# Patient Record
Sex: MALE | Race: BLACK OR AFRICAN AMERICAN | NOT HISPANIC OR LATINO | Employment: FULL TIME | URBAN - METROPOLITAN AREA
[De-identification: names, ages, dates, MRNs, and addresses within clinical notes are randomized per-mention and may not be internally consistent; named-entity substitution may affect disease eponyms.]

---

## 2023-04-12 LAB — URATE (MG/DL) IN SER/PLAS: 9.6 MG/DL (ref 4–7.5)

## 2023-05-18 DIAGNOSIS — M10.9 GOUT, UNSPECIFIED CAUSE, UNSPECIFIED CHRONICITY, UNSPECIFIED SITE: Primary | ICD-10-CM

## 2023-05-18 RX ORDER — FEBUXOSTAT 80 MG/1
TABLET, FILM COATED ORAL
Qty: 90 TABLET | Refills: 0 | Status: SHIPPED | OUTPATIENT
Start: 2023-05-18 | End: 2024-02-05 | Stop reason: SDUPTHER

## 2023-07-09 DIAGNOSIS — E55.9 VITAMIN D DEFICIENCY, UNSPECIFIED: ICD-10-CM

## 2023-07-11 RX ORDER — VIT C/E/ZN/COPPR/LUTEIN/ZEAXAN 250MG-90MG
CAPSULE ORAL
Qty: 90 CAPSULE | Refills: 2 | Status: SHIPPED | OUTPATIENT
Start: 2023-07-11 | End: 2024-02-05 | Stop reason: ALTCHOICE

## 2023-08-03 LAB — PROSTATE SPECIFIC AG (NG/ML) IN SER/PLAS: 2 NG/ML (ref 0–4)

## 2023-08-20 DIAGNOSIS — I10 ESSENTIAL (PRIMARY) HYPERTENSION: ICD-10-CM

## 2023-08-21 RX ORDER — TERAZOSIN 5 MG/1
5 CAPSULE ORAL 2 TIMES DAILY
Qty: 60 CAPSULE | Refills: 0 | Status: SHIPPED | OUTPATIENT
Start: 2023-08-21 | End: 2023-09-25

## 2023-09-08 DIAGNOSIS — I10 PRIMARY HYPERTENSION: Primary | ICD-10-CM

## 2023-09-08 RX ORDER — LISINOPRIL 10 MG/1
1 TABLET ORAL DAILY
COMMUNITY
Start: 2013-08-29 | End: 2023-09-08 | Stop reason: SDUPTHER

## 2023-09-09 DIAGNOSIS — I10 PRIMARY HYPERTENSION: Primary | ICD-10-CM

## 2023-09-09 RX ORDER — TRIAMTERENE AND HYDROCHLOROTHIAZIDE 75; 50 MG/1; MG/1
1 TABLET ORAL DAILY
Qty: 30 TABLET | Refills: 0 | Status: SHIPPED | OUTPATIENT
Start: 2023-09-09 | End: 2024-02-05 | Stop reason: SDUPTHER

## 2023-09-09 RX ORDER — TRIAMTERENE AND HYDROCHLOROTHIAZIDE 75; 50 MG/1; MG/1
1 TABLET ORAL DAILY
COMMUNITY
Start: 2012-11-16 | End: 2023-09-09 | Stop reason: SDUPTHER

## 2023-09-12 RX ORDER — LISINOPRIL 10 MG/1
10 TABLET ORAL DAILY
Qty: 30 TABLET | Refills: 0 | Status: SHIPPED | OUTPATIENT
Start: 2023-09-12 | End: 2024-02-05 | Stop reason: ALTCHOICE

## 2023-09-25 DIAGNOSIS — I10 ESSENTIAL (PRIMARY) HYPERTENSION: ICD-10-CM

## 2023-09-25 RX ORDER — TERAZOSIN 5 MG/1
5 CAPSULE ORAL 2 TIMES DAILY
Qty: 60 CAPSULE | Refills: 0 | Status: SHIPPED | OUTPATIENT
Start: 2023-09-25 | End: 2023-11-07

## 2023-10-31 DIAGNOSIS — E78.2 MIXED HYPERLIPIDEMIA: ICD-10-CM

## 2023-11-02 RX ORDER — ATORVASTATIN CALCIUM 20 MG/1
20 TABLET, FILM COATED ORAL NIGHTLY
Qty: 30 TABLET | Refills: 0 | Status: SHIPPED | OUTPATIENT
Start: 2023-11-02 | End: 2024-02-05 | Stop reason: SDUPTHER

## 2023-11-04 DIAGNOSIS — I10 ESSENTIAL (PRIMARY) HYPERTENSION: ICD-10-CM

## 2023-11-07 RX ORDER — TERAZOSIN 5 MG/1
5 CAPSULE ORAL 2 TIMES DAILY
Qty: 60 CAPSULE | Refills: 0 | Status: SHIPPED | OUTPATIENT
Start: 2023-11-07 | End: 2023-12-27 | Stop reason: SDUPTHER

## 2023-12-15 DIAGNOSIS — R12 HEARTBURN: ICD-10-CM

## 2023-12-18 RX ORDER — OMEPRAZOLE 40 MG/1
40 CAPSULE, DELAYED RELEASE ORAL DAILY
Qty: 90 CAPSULE | Refills: 1 | Status: SHIPPED | OUTPATIENT
Start: 2023-12-18

## 2023-12-23 DIAGNOSIS — I10 ESSENTIAL (PRIMARY) HYPERTENSION: ICD-10-CM

## 2023-12-26 RX ORDER — TERAZOSIN 5 MG/1
5 CAPSULE ORAL 2 TIMES DAILY
Qty: 60 CAPSULE | Refills: 0 | OUTPATIENT
Start: 2023-12-26 | End: 2024-01-25

## 2023-12-29 PROBLEM — M10.9 GOUT: Status: ACTIVE | Noted: 2023-12-29

## 2023-12-29 PROBLEM — D72.819 LEUKOPENIA: Status: ACTIVE | Noted: 2023-12-29

## 2023-12-29 PROBLEM — E78.2 COMBINED HYPERLIPIDEMIA: Status: ACTIVE | Noted: 2023-12-29

## 2023-12-29 PROBLEM — E05.90 SUBCLINICAL HYPERTHYROIDISM: Status: ACTIVE | Noted: 2023-12-29

## 2023-12-29 PROBLEM — L72.3 SEBACEOUS CYST: Status: ACTIVE | Noted: 2023-12-29

## 2023-12-29 PROBLEM — E79.0 HYPERURICEMIA: Status: ACTIVE | Noted: 2023-12-29

## 2023-12-29 PROBLEM — M26.9 MANDIBULAR ANOMALY: Status: ACTIVE | Noted: 2023-12-29

## 2023-12-29 PROBLEM — R97.20 ELEVATED PSA: Status: ACTIVE | Noted: 2023-12-29

## 2023-12-29 PROBLEM — N13.8 BENIGN LOCALIZED HYPERPLASIA OF PROSTATE WITH URINARY OBSTRUCTION: Status: ACTIVE | Noted: 2023-12-29

## 2023-12-29 PROBLEM — E27.8 ADRENAL NODULE (MULTI): Status: ACTIVE | Noted: 2023-12-29

## 2023-12-29 PROBLEM — M25.562 LEFT KNEE PAIN: Status: ACTIVE | Noted: 2023-12-29

## 2023-12-29 PROBLEM — E27.9 ADRENAL NODULE: Status: ACTIVE | Noted: 2023-12-29

## 2023-12-29 PROBLEM — M25.571 RIGHT ANKLE PAIN: Status: ACTIVE | Noted: 2023-12-29

## 2023-12-29 PROBLEM — R12 HEARTBURN: Status: ACTIVE | Noted: 2023-12-29

## 2023-12-29 PROBLEM — I10 BENIGN ESSENTIAL HTN: Status: ACTIVE | Noted: 2023-12-29

## 2023-12-29 PROBLEM — M10.9 ACUTE GOUT INVOLVING TOE OF RIGHT FOOT: Status: ACTIVE | Noted: 2023-12-29

## 2023-12-29 PROBLEM — R73.03 PREDIABETES: Status: ACTIVE | Noted: 2023-12-29

## 2023-12-29 PROBLEM — R33.9 INCOMPLETE EMPTYING OF BLADDER: Status: ACTIVE | Noted: 2023-12-29

## 2023-12-29 PROBLEM — R94.31 ABNORMAL EKG: Status: ACTIVE | Noted: 2023-12-29

## 2023-12-29 PROBLEM — E11.9 DIABETES MELLITUS (MULTI): Status: ACTIVE | Noted: 2023-12-29

## 2023-12-29 PROBLEM — E55.9 VITAMIN D DEFICIENCY: Status: ACTIVE | Noted: 2023-12-29

## 2023-12-29 PROBLEM — S86.819A STRAIN OF CALF MUSCLE: Status: ACTIVE | Noted: 2023-12-29

## 2023-12-29 PROBLEM — I49.3 ASYMPTOMATIC PVCS: Status: ACTIVE | Noted: 2023-12-29

## 2023-12-29 PROBLEM — N40.1 BENIGN LOCALIZED HYPERPLASIA OF PROSTATE WITH URINARY OBSTRUCTION: Status: ACTIVE | Noted: 2023-12-29

## 2023-12-29 RX ORDER — FEBUXOSTAT 80 MG/1
1 TABLET, FILM COATED ORAL DAILY
COMMUNITY
Start: 2018-09-17 | End: 2024-04-19 | Stop reason: SDUPTHER

## 2023-12-29 RX ORDER — ATORVASTATIN CALCIUM 20 MG/1
1 TABLET, FILM COATED ORAL NIGHTLY
COMMUNITY
Start: 2021-11-23 | End: 2024-02-19 | Stop reason: SDUPTHER

## 2023-12-29 RX ORDER — ACETAMINOPHEN 500 MG
1 TABLET ORAL DAILY
COMMUNITY
Start: 2021-11-23 | End: 2024-05-31 | Stop reason: SDUPTHER

## 2023-12-29 RX ORDER — TERAZOSIN 5 MG/1
1 CAPSULE ORAL 2 TIMES DAILY
COMMUNITY
Start: 2014-03-21 | End: 2024-02-05 | Stop reason: SDUPTHER

## 2023-12-29 RX ORDER — TERAZOSIN 5 MG/1
5 CAPSULE ORAL 2 TIMES DAILY
Qty: 60 CAPSULE | Refills: 0 | Status: SHIPPED | OUTPATIENT
Start: 2023-12-29 | End: 2024-01-26

## 2023-12-29 RX ORDER — VIT C/E/ZN/COPPR/LUTEIN/ZEAXAN 250MG-90MG
1 CAPSULE ORAL DAILY
COMMUNITY
Start: 2021-11-23 | End: 2024-02-05 | Stop reason: ALTCHOICE

## 2023-12-29 RX ORDER — FEBUXOSTAT 40 MG/1
1 TABLET, FILM COATED ORAL DAILY
COMMUNITY
End: 2024-05-31 | Stop reason: ALTCHOICE

## 2023-12-29 RX ORDER — OMEPRAZOLE 40 MG/1
1 CAPSULE, DELAYED RELEASE ORAL DAILY
COMMUNITY
Start: 2023-07-12 | End: 2024-02-05 | Stop reason: SDUPTHER

## 2023-12-29 RX ORDER — IBUPROFEN 600 MG/1
TABLET ORAL EVERY 6 HOURS PRN
COMMUNITY
Start: 2022-05-02

## 2023-12-29 RX ORDER — METFORMIN HYDROCHLORIDE 500 MG/1
500 TABLET ORAL DAILY
COMMUNITY
End: 2024-02-09 | Stop reason: SINTOL

## 2024-01-10 ENCOUNTER — APPOINTMENT (OUTPATIENT)
Dept: PRIMARY CARE | Facility: CLINIC | Age: 59
End: 2024-01-10
Payer: COMMERCIAL

## 2024-01-25 DIAGNOSIS — I10 ESSENTIAL (PRIMARY) HYPERTENSION: ICD-10-CM

## 2024-01-26 RX ORDER — TERAZOSIN 5 MG/1
5 CAPSULE ORAL 2 TIMES DAILY
Qty: 60 CAPSULE | Refills: 0 | Status: SHIPPED | OUTPATIENT
Start: 2024-01-26 | End: 2024-02-05 | Stop reason: SDUPTHER

## 2024-01-31 ENCOUNTER — APPOINTMENT (OUTPATIENT)
Dept: PRIMARY CARE | Facility: CLINIC | Age: 59
End: 2024-01-31
Payer: COMMERCIAL

## 2024-02-05 ENCOUNTER — OFFICE VISIT (OUTPATIENT)
Dept: PRIMARY CARE | Facility: CLINIC | Age: 59
End: 2024-02-05
Payer: COMMERCIAL

## 2024-02-05 VITALS
HEIGHT: 70 IN | DIASTOLIC BLOOD PRESSURE: 75 MMHG | SYSTOLIC BLOOD PRESSURE: 149 MMHG | RESPIRATION RATE: 16 BRPM | BODY MASS INDEX: 36.13 KG/M2 | HEART RATE: 83 BPM | TEMPERATURE: 98 F | OXYGEN SATURATION: 96 % | WEIGHT: 252.4 LBS

## 2024-02-05 DIAGNOSIS — I10 PRIMARY HYPERTENSION: ICD-10-CM

## 2024-02-05 DIAGNOSIS — I10 BENIGN ESSENTIAL HTN: ICD-10-CM

## 2024-02-05 DIAGNOSIS — N40.1 BENIGN LOCALIZED HYPERPLASIA OF PROSTATE WITH URINARY OBSTRUCTION: ICD-10-CM

## 2024-02-05 DIAGNOSIS — E11.9 TYPE 2 DIABETES MELLITUS WITHOUT COMPLICATION, WITHOUT LONG-TERM CURRENT USE OF INSULIN (MULTI): Primary | ICD-10-CM

## 2024-02-05 DIAGNOSIS — N13.8 BENIGN LOCALIZED HYPERPLASIA OF PROSTATE WITH URINARY OBSTRUCTION: ICD-10-CM

## 2024-02-05 DIAGNOSIS — M10.9 GOUT, UNSPECIFIED CAUSE, UNSPECIFIED CHRONICITY, UNSPECIFIED SITE: ICD-10-CM

## 2024-02-05 PROCEDURE — 83036 HEMOGLOBIN GLYCOSYLATED A1C: CPT | Performed by: INTERNAL MEDICINE

## 2024-02-05 PROCEDURE — 99213 OFFICE O/P EST LOW 20 MIN: CPT | Performed by: INTERNAL MEDICINE

## 2024-02-05 PROCEDURE — 36415 COLL VENOUS BLD VENIPUNCTURE: CPT | Performed by: INTERNAL MEDICINE

## 2024-02-05 PROCEDURE — 3078F DIAST BP <80 MM HG: CPT | Performed by: INTERNAL MEDICINE

## 2024-02-05 PROCEDURE — 4010F ACE/ARB THERAPY RXD/TAKEN: CPT | Performed by: INTERNAL MEDICINE

## 2024-02-05 PROCEDURE — 3077F SYST BP >= 140 MM HG: CPT | Performed by: INTERNAL MEDICINE

## 2024-02-05 PROCEDURE — 3044F HG A1C LEVEL LT 7.0%: CPT | Performed by: INTERNAL MEDICINE

## 2024-02-05 RX ORDER — TERAZOSIN 5 MG/1
5 CAPSULE ORAL 2 TIMES DAILY
Qty: 180 CAPSULE | Refills: 1 | Status: SHIPPED | OUTPATIENT
Start: 2024-02-05

## 2024-02-05 RX ORDER — TRIAMTERENE AND HYDROCHLOROTHIAZIDE 75; 50 MG/1; MG/1
1 TABLET ORAL DAILY
Qty: 90 TABLET | Refills: 1 | Status: SHIPPED | OUTPATIENT
Start: 2024-02-05 | End: 2025-03-05

## 2024-02-05 RX ORDER — LISINOPRIL 20 MG/1
20 TABLET ORAL DAILY
Qty: 90 TABLET | Refills: 1 | Status: SHIPPED | OUTPATIENT
Start: 2024-02-05 | End: 2024-08-03

## 2024-02-05 SDOH — ECONOMIC STABILITY: FOOD INSECURITY: WITHIN THE PAST 12 MONTHS, THE FOOD YOU BOUGHT JUST DIDN'T LAST AND YOU DIDN'T HAVE MONEY TO GET MORE.: NEVER TRUE

## 2024-02-05 SDOH — ECONOMIC STABILITY: FOOD INSECURITY: WITHIN THE PAST 12 MONTHS, YOU WORRIED THAT YOUR FOOD WOULD RUN OUT BEFORE YOU GOT MONEY TO BUY MORE.: NEVER TRUE

## 2024-02-05 ASSESSMENT — LIFESTYLE VARIABLES
HOW MANY STANDARD DRINKS CONTAINING ALCOHOL DO YOU HAVE ON A TYPICAL DAY: PATIENT DOES NOT DRINK
AUDIT-C TOTAL SCORE: 0
SKIP TO QUESTIONS 9-10: 1
HOW OFTEN DO YOU HAVE A DRINK CONTAINING ALCOHOL: NEVER
HOW OFTEN DO YOU HAVE SIX OR MORE DRINKS ON ONE OCCASION: NEVER

## 2024-02-05 ASSESSMENT — ENCOUNTER SYMPTOMS
NAUSEA: 0
FEVER: 0
LOSS OF SENSATION IN FEET: 0
VOMITING: 0
OCCASIONAL FEELINGS OF UNSTEADINESS: 0
DEPRESSION: 0
ABDOMINAL PAIN: 0
CHILLS: 0
SHORTNESS OF BREATH: 0

## 2024-02-05 ASSESSMENT — PAIN SCALES - GENERAL: PAINLEVEL: 0-NO PAIN

## 2024-02-05 NOTE — PROGRESS NOTES
"Subjective   Patient ID: Thad Begum is a 58 y.o. male who presents for Follow-up.    Presents for follow up. Reports waking up earlier now to urinate.         Review of Systems   Constitutional:  Negative for chills and fever.   Respiratory:  Negative for shortness of breath.    Cardiovascular:  Negative for chest pain.   Gastrointestinal:  Negative for abdominal pain, nausea and vomiting.       Objective   /75 (BP Location: Left arm, Patient Position: Sitting, BP Cuff Size: Large adult)   Pulse 83   Temp 36.7 °C (98 °F) (Temporal)   Resp 16   Ht 1.784 m (5' 10.25\")   Wt 114 kg (252 lb 6.4 oz)   SpO2 96%   BMI 35.96 kg/m²     Physical Exam  Gen appearance: well groomed in NAD  A & O: alert and oriented x 3  CV: RRR   Lungs: CTA bilaterally  Extr: no edema   Assessment/Plan   Diabetes: check A1c today  High blood pressure: increase Lisinopril to 20 mg  Gout: controlled  BPH: pt will decrease nightly consumption of liquids close to bed time. If no resolution, contact .  Adrenal adenoma: still need follow up MRI. Was declined by insurance last year       "

## 2024-02-06 LAB
EST. AVERAGE GLUCOSE BLD GHB EST-MCNC: 146 MG/DL
HBA1C MFR BLD: 6.7 %

## 2024-02-09 DIAGNOSIS — E11.9 TYPE 2 DIABETES MELLITUS WITHOUT COMPLICATION, WITHOUT LONG-TERM CURRENT USE OF INSULIN (MULTI): Primary | ICD-10-CM

## 2024-02-09 DIAGNOSIS — E11.9 TYPE 2 DIABETES MELLITUS WITHOUT COMPLICATIONS (MULTI): ICD-10-CM

## 2024-02-09 RX ORDER — METFORMIN HYDROCHLORIDE 500 MG/1
500 TABLET ORAL DAILY
Qty: 90 TABLET | Refills: 1 | OUTPATIENT
Start: 2024-02-09

## 2024-02-12 ENCOUNTER — TELEPHONE (OUTPATIENT)
Dept: PRIMARY CARE | Facility: CLINIC | Age: 59
End: 2024-02-12
Payer: COMMERCIAL

## 2024-02-19 ENCOUNTER — OFFICE VISIT (OUTPATIENT)
Dept: PRIMARY CARE | Facility: CLINIC | Age: 59
End: 2024-02-19
Payer: COMMERCIAL

## 2024-02-19 VITALS
DIASTOLIC BLOOD PRESSURE: 72 MMHG | BODY MASS INDEX: 36.73 KG/M2 | HEIGHT: 70 IN | RESPIRATION RATE: 16 BRPM | TEMPERATURE: 97.6 F | OXYGEN SATURATION: 96 % | WEIGHT: 256.6 LBS | SYSTOLIC BLOOD PRESSURE: 126 MMHG | HEART RATE: 94 BPM

## 2024-02-19 DIAGNOSIS — I10 BENIGN ESSENTIAL HTN: Primary | ICD-10-CM

## 2024-02-19 DIAGNOSIS — E78.2 COMBINED HYPERLIPIDEMIA: ICD-10-CM

## 2024-02-19 PROCEDURE — 3074F SYST BP LT 130 MM HG: CPT | Performed by: INTERNAL MEDICINE

## 2024-02-19 PROCEDURE — 3044F HG A1C LEVEL LT 7.0%: CPT | Performed by: INTERNAL MEDICINE

## 2024-02-19 PROCEDURE — 4010F ACE/ARB THERAPY RXD/TAKEN: CPT | Performed by: INTERNAL MEDICINE

## 2024-02-19 PROCEDURE — 3078F DIAST BP <80 MM HG: CPT | Performed by: INTERNAL MEDICINE

## 2024-02-19 PROCEDURE — 99213 OFFICE O/P EST LOW 20 MIN: CPT | Performed by: INTERNAL MEDICINE

## 2024-02-19 RX ORDER — ATORVASTATIN CALCIUM 20 MG/1
20 TABLET, FILM COATED ORAL NIGHTLY
Qty: 90 TABLET | Refills: 1 | Status: SHIPPED | OUTPATIENT
Start: 2024-02-19

## 2024-02-19 ASSESSMENT — ENCOUNTER SYMPTOMS
LOSS OF SENSATION IN FEET: 0
OCCASIONAL FEELINGS OF UNSTEADINESS: 0
DEPRESSION: 0

## 2024-02-19 ASSESSMENT — PAIN SCALES - GENERAL: PAINLEVEL: 0-NO PAIN

## 2024-02-19 NOTE — PROGRESS NOTES
"Subjective   Patient ID: Thad Begum is a 58 y.o. male who presents for blood pressure check.   .  HPI:  Presents for follow up of bp after increasing Lisinopril to 20 mg. No problems reported with the increased dose. Was able to afford the Januvia and has started taking it.     ROS: No chest pain/SOB/SOTELO/cough    Objective   /72 (BP Location: Left arm, Patient Position: Sitting, BP Cuff Size: Large adult)   Pulse 94   Temp 36.4 °C (97.6 °F) (Temporal)   Resp 16   Ht 1.778 m (5' 10\")   Wt 116 kg (256 lb 9.6 oz)   SpO2 96%   BMI 36.82 kg/m²     Physical Exam  Gen appearance: well groomed in NAD  A & O: alert and oriented x 3  CV: RRR   Lungs: CTA bilaterally  Extr: no edema    Assessment/Plan   High blood pressure: cont meds  High cholesterol: renewed script  RTO March for A1c check.        "

## 2024-03-12 DIAGNOSIS — I10 ESSENTIAL (PRIMARY) HYPERTENSION: ICD-10-CM

## 2024-03-12 RX ORDER — LISINOPRIL 10 MG/1
10 TABLET ORAL DAILY
Qty: 90 TABLET | Refills: 1 | Status: SHIPPED | OUTPATIENT
Start: 2024-03-12 | End: 2024-05-31 | Stop reason: ALTCHOICE

## 2024-03-21 RX ORDER — METFORMIN HYDROCHLORIDE 500 MG/1
500 TABLET ORAL DAILY
Qty: 90 TABLET | Refills: 1 | OUTPATIENT
Start: 2024-03-21

## 2024-04-19 DIAGNOSIS — E79.0 HYPERURICEMIA WITHOUT SIGNS OF INFLAMMATORY ARTHRITIS AND TOPHACEOUS DISEASE: ICD-10-CM

## 2024-04-19 DIAGNOSIS — E11.9 TYPE 2 DIABETES MELLITUS WITHOUT COMPLICATION, WITHOUT LONG-TERM CURRENT USE OF INSULIN (MULTI): ICD-10-CM

## 2024-04-19 RX ORDER — FEBUXOSTAT 80 MG/1
80 TABLET, FILM COATED ORAL DAILY
Qty: 90 TABLET | Refills: 1 | Status: SHIPPED | OUTPATIENT
Start: 2024-04-19

## 2024-05-22 ENCOUNTER — APPOINTMENT (OUTPATIENT)
Dept: PRIMARY CARE | Facility: CLINIC | Age: 59
End: 2024-05-22
Payer: COMMERCIAL

## 2024-05-31 ENCOUNTER — OFFICE VISIT (OUTPATIENT)
Dept: PRIMARY CARE | Facility: CLINIC | Age: 59
End: 2024-05-31
Payer: COMMERCIAL

## 2024-05-31 VITALS
SYSTOLIC BLOOD PRESSURE: 130 MMHG | HEART RATE: 90 BPM | BODY MASS INDEX: 37.05 KG/M2 | WEIGHT: 258.8 LBS | TEMPERATURE: 98.3 F | DIASTOLIC BLOOD PRESSURE: 74 MMHG | OXYGEN SATURATION: 96 % | HEIGHT: 70 IN

## 2024-05-31 DIAGNOSIS — E55.9 VITAMIN D DEFICIENCY: ICD-10-CM

## 2024-05-31 DIAGNOSIS — E78.2 COMBINED HYPERLIPIDEMIA: ICD-10-CM

## 2024-05-31 DIAGNOSIS — I10 BENIGN ESSENTIAL HTN: ICD-10-CM

## 2024-05-31 DIAGNOSIS — E11.9 TYPE 2 DIABETES MELLITUS WITHOUT COMPLICATION, WITHOUT LONG-TERM CURRENT USE OF INSULIN (MULTI): Primary | ICD-10-CM

## 2024-05-31 PROCEDURE — 36415 COLL VENOUS BLD VENIPUNCTURE: CPT | Performed by: INTERNAL MEDICINE

## 2024-05-31 PROCEDURE — 99213 OFFICE O/P EST LOW 20 MIN: CPT | Performed by: INTERNAL MEDICINE

## 2024-05-31 PROCEDURE — 4010F ACE/ARB THERAPY RXD/TAKEN: CPT | Performed by: INTERNAL MEDICINE

## 2024-05-31 PROCEDURE — 3075F SYST BP GE 130 - 139MM HG: CPT | Performed by: INTERNAL MEDICINE

## 2024-05-31 PROCEDURE — 3078F DIAST BP <80 MM HG: CPT | Performed by: INTERNAL MEDICINE

## 2024-05-31 PROCEDURE — 3044F HG A1C LEVEL LT 7.0%: CPT | Performed by: INTERNAL MEDICINE

## 2024-05-31 PROCEDURE — 83036 HEMOGLOBIN GLYCOSYLATED A1C: CPT | Performed by: INTERNAL MEDICINE

## 2024-05-31 RX ORDER — ACETAMINOPHEN 500 MG
2000 TABLET ORAL DAILY
Qty: 90 CAPSULE | Refills: 1 | Status: SHIPPED | OUTPATIENT
Start: 2024-05-31

## 2024-05-31 ASSESSMENT — ENCOUNTER SYMPTOMS
SHORTNESS OF BREATH: 0
FEVER: 0
CHILLS: 0
NAUSEA: 0
DEPRESSION: 0
LOSS OF SENSATION IN FEET: 0
VOMITING: 0
OCCASIONAL FEELINGS OF UNSTEADINESS: 0
ABDOMINAL PAIN: 0

## 2024-05-31 ASSESSMENT — PAIN SCALES - GENERAL: PAINLEVEL: 0-NO PAIN

## 2024-05-31 NOTE — PROGRESS NOTES
"Subjective   Patient ID: Thad Begum is a 58 y.o. male who presents for Follow-up.    Presents for follow up. Taking all meds as prescribed. Reports walking up basement steps with laundry and \"sometimes\" feels like his heart is beating fast. Works out on the treadmill faithfully and has no problems then. Does not report this feeling at rest. States has \"worn a patch\" in the past that didn't show anything. No miguelangel CP any other time. Is not short of breath when that does occur. Does not occur \"often.\"         Review of Systems   Constitutional:  Negative for chills and fever.   Respiratory:  Negative for shortness of breath.    Cardiovascular:  Negative for chest pain.   Gastrointestinal:  Negative for abdominal pain, nausea and vomiting.       Objective   /74 (BP Location: Left arm, Patient Position: Sitting, BP Cuff Size: Large adult)   Pulse 90   Temp 36.8 °C (98.3 °F) (Skin)   Ht 1.778 m (5' 10\")   Wt 117 kg (258 lb 12.8 oz)   SpO2 96%   BMI 37.13 kg/m²     Physical Exam  Gen appearance: well groomed in NAD  A & O: alert and oriented x 3  CV: RRR   Lungs: CTA bilaterally  Extr: no edema   Assessment/Plan   HTN: cont meds  2.    DM: check A1c today  3.    High chol: cont med  4.    We discussed that you had a normal exercise stress test and ECHO in 2021. If these symptoms persist, worsen, or increase in frequency you are to call me and I will refer you to Cardiology.You have agreed with this plan.  5.     RTO July for CPE.        "

## 2024-06-01 LAB
EST. AVERAGE GLUCOSE BLD GHB EST-MCNC: 131 MG/DL
HBA1C MFR BLD: 6.2 %

## 2024-06-16 DIAGNOSIS — E78.2 COMBINED HYPERLIPIDEMIA: ICD-10-CM

## 2024-06-17 RX ORDER — ATORVASTATIN CALCIUM 20 MG/1
20 TABLET, FILM COATED ORAL NIGHTLY
Qty: 90 TABLET | Refills: 1 | Status: SHIPPED | OUTPATIENT
Start: 2024-06-17

## 2024-06-18 DIAGNOSIS — R12 HEARTBURN: ICD-10-CM

## 2024-06-18 RX ORDER — OMEPRAZOLE 40 MG/1
40 CAPSULE, DELAYED RELEASE ORAL DAILY
Qty: 90 CAPSULE | Refills: 1 | Status: SHIPPED | OUTPATIENT
Start: 2024-06-18

## 2024-07-24 ENCOUNTER — OFFICE VISIT (OUTPATIENT)
Dept: PRIMARY CARE | Facility: CLINIC | Age: 59
End: 2024-07-24
Payer: COMMERCIAL

## 2024-07-24 VITALS
DIASTOLIC BLOOD PRESSURE: 72 MMHG | HEIGHT: 70 IN | HEART RATE: 93 BPM | BODY MASS INDEX: 37.25 KG/M2 | RESPIRATION RATE: 16 BRPM | OXYGEN SATURATION: 99 % | TEMPERATURE: 97.3 F | WEIGHT: 260.2 LBS | SYSTOLIC BLOOD PRESSURE: 110 MMHG

## 2024-07-24 DIAGNOSIS — Z00.00 ENCOUNTER FOR PREVENTIVE HEALTH EXAMINATION: ICD-10-CM

## 2024-07-24 DIAGNOSIS — E79.0 HYPERURICEMIA WITHOUT SIGNS OF INFLAMMATORY ARTHRITIS AND TOPHACEOUS DISEASE: ICD-10-CM

## 2024-07-24 DIAGNOSIS — E11.9 TYPE 2 DIABETES MELLITUS WITHOUT COMPLICATION, WITHOUT LONG-TERM CURRENT USE OF INSULIN (MULTI): Primary | ICD-10-CM

## 2024-07-24 PROBLEM — M25.562 LEFT KNEE PAIN: Status: RESOLVED | Noted: 2023-12-29 | Resolved: 2024-07-24

## 2024-07-24 PROCEDURE — 82306 VITAMIN D 25 HYDROXY: CPT | Performed by: INTERNAL MEDICINE

## 2024-07-24 PROCEDURE — 83036 HEMOGLOBIN GLYCOSYLATED A1C: CPT | Performed by: INTERNAL MEDICINE

## 2024-07-24 PROCEDURE — 36415 COLL VENOUS BLD VENIPUNCTURE: CPT | Performed by: INTERNAL MEDICINE

## 2024-07-24 PROCEDURE — 80061 LIPID PANEL: CPT | Performed by: INTERNAL MEDICINE

## 2024-07-24 PROCEDURE — 84443 ASSAY THYROID STIM HORMONE: CPT | Performed by: INTERNAL MEDICINE

## 2024-07-24 PROCEDURE — 3008F BODY MASS INDEX DOCD: CPT | Performed by: INTERNAL MEDICINE

## 2024-07-24 PROCEDURE — 4010F ACE/ARB THERAPY RXD/TAKEN: CPT | Performed by: INTERNAL MEDICINE

## 2024-07-24 PROCEDURE — 85027 COMPLETE CBC AUTOMATED: CPT | Performed by: INTERNAL MEDICINE

## 2024-07-24 PROCEDURE — 99396 PREV VISIT EST AGE 40-64: CPT | Performed by: INTERNAL MEDICINE

## 2024-07-24 PROCEDURE — 1036F TOBACCO NON-USER: CPT | Performed by: INTERNAL MEDICINE

## 2024-07-24 PROCEDURE — 3074F SYST BP LT 130 MM HG: CPT | Performed by: INTERNAL MEDICINE

## 2024-07-24 PROCEDURE — 3078F DIAST BP <80 MM HG: CPT | Performed by: INTERNAL MEDICINE

## 2024-07-24 PROCEDURE — 3044F HG A1C LEVEL LT 7.0%: CPT | Performed by: INTERNAL MEDICINE

## 2024-07-24 PROCEDURE — 84550 ASSAY OF BLOOD/URIC ACID: CPT | Performed by: INTERNAL MEDICINE

## 2024-07-24 PROCEDURE — 80053 COMPREHEN METABOLIC PANEL: CPT | Performed by: INTERNAL MEDICINE

## 2024-07-24 RX ORDER — FEBUXOSTAT 80 MG/1
80 TABLET, FILM COATED ORAL DAILY
Qty: 90 TABLET | Refills: 1 | Status: SHIPPED | OUTPATIENT
Start: 2024-07-24

## 2024-07-24 SDOH — ECONOMIC STABILITY: FOOD INSECURITY: WITHIN THE PAST 12 MONTHS, THE FOOD YOU BOUGHT JUST DIDN'T LAST AND YOU DIDN'T HAVE MONEY TO GET MORE.: NEVER TRUE

## 2024-07-24 SDOH — ECONOMIC STABILITY: FOOD INSECURITY: WITHIN THE PAST 12 MONTHS, YOU WORRIED THAT YOUR FOOD WOULD RUN OUT BEFORE YOU GOT MONEY TO BUY MORE.: NEVER TRUE

## 2024-07-24 ASSESSMENT — ENCOUNTER SYMPTOMS
NAUSEA: 0
ABDOMINAL PAIN: 0
SHORTNESS OF BREATH: 0
FEVER: 0
OCCASIONAL FEELINGS OF UNSTEADINESS: 0
VOMITING: 0
CHILLS: 0
DEPRESSION: 0
LOSS OF SENSATION IN FEET: 0

## 2024-07-24 ASSESSMENT — LIFESTYLE VARIABLES: HOW MANY STANDARD DRINKS CONTAINING ALCOHOL DO YOU HAVE ON A TYPICAL DAY: PATIENT DOES NOT DRINK

## 2024-07-24 ASSESSMENT — PATIENT HEALTH QUESTIONNAIRE - PHQ9
SUM OF ALL RESPONSES TO PHQ9 QUESTIONS 1 AND 2: 0
2. FEELING DOWN, DEPRESSED OR HOPELESS: NOT AT ALL
1. LITTLE INTEREST OR PLEASURE IN DOING THINGS: NOT AT ALL

## 2024-07-24 ASSESSMENT — PAIN SCALES - GENERAL: PAINLEVEL: 0-NO PAIN

## 2024-07-24 NOTE — PROGRESS NOTES
"Subjective   Patient ID: Thad Begum is a 58 y.o. male who presents for Annual Exam (PHYSICAL).    Presents for annual exam. Describes an intermittent throbbing along his right temple but it is not pain.          Review of Systems   Constitutional:  Negative for chills and fever.   Respiratory:  Negative for shortness of breath.    Cardiovascular:  Negative for chest pain.   Gastrointestinal:  Negative for abdominal pain, nausea and vomiting.       Objective   /72   Pulse 93   Temp 36.3 °C (97.3 °F)   Resp 16   Ht 1.778 m (5' 10\")   Wt 118 kg (260 lb 3.2 oz)   SpO2 99%   BMI 37.33 kg/m²     Physical Exam    Gen:well groomed in Nad  A & O: alert and oriented x 3  HEENT: PEERL, EOMI, TM's clear bilaterally, no carotid bruits, no palpable thyroid nodules  CV: RRR  Lungs: CTA bilaterally  Abd: soft, NT/ND  Extr: no edema  Neuro: CN 2-12 intact, motor 5/5, sensation intact  Rectal: deferred to     Assessment/Plan   Check annual labs  Keep appt with Urology for annual visit. We discussed bringing up how the Terazosin is dosed and could it be modified.   RTO 6 mos  4.   UTD colon     "

## 2024-07-25 LAB
25(OH)D3 SERPL-MCNC: 31 NG/ML (ref 30–100)
ALBUMIN SERPL BCP-MCNC: 4.2 G/DL (ref 3.4–5)
ALP SERPL-CCNC: 72 U/L (ref 33–120)
ALT SERPL W P-5'-P-CCNC: 28 U/L (ref 10–52)
ANION GAP SERPL CALC-SCNC: 12 MMOL/L (ref 10–20)
AST SERPL W P-5'-P-CCNC: 19 U/L (ref 9–39)
BILIRUB SERPL-MCNC: 0.7 MG/DL (ref 0–1.2)
BUN SERPL-MCNC: 18 MG/DL (ref 6–23)
CALCIUM SERPL-MCNC: 9.9 MG/DL (ref 8.6–10.6)
CHLORIDE SERPL-SCNC: 103 MMOL/L (ref 98–107)
CHOLEST SERPL-MCNC: 141 MG/DL (ref 0–199)
CHOLESTEROL/HDL RATIO: 2.6
CO2 SERPL-SCNC: 27 MMOL/L (ref 21–32)
CREAT SERPL-MCNC: 1.05 MG/DL (ref 0.5–1.3)
EGFRCR SERPLBLD CKD-EPI 2021: 83 ML/MIN/1.73M*2
ERYTHROCYTE [DISTWIDTH] IN BLOOD BY AUTOMATED COUNT: 12.5 % (ref 11.5–14.5)
EST. AVERAGE GLUCOSE BLD GHB EST-MCNC: 131 MG/DL
GLUCOSE SERPL-MCNC: 143 MG/DL (ref 74–99)
HBA1C MFR BLD: 6.2 %
HCT VFR BLD AUTO: 43.8 % (ref 41–52)
HDLC SERPL-MCNC: 53.3 MG/DL
HGB BLD-MCNC: 14.1 G/DL (ref 13.5–17.5)
LDLC SERPL CALC-MCNC: 77 MG/DL
MCH RBC QN AUTO: 28.1 PG (ref 26–34)
MCHC RBC AUTO-ENTMCNC: 32.2 G/DL (ref 32–36)
MCV RBC AUTO: 87 FL (ref 80–100)
NON HDL CHOLESTEROL: 88 MG/DL (ref 0–149)
NRBC BLD-RTO: 0 /100 WBCS (ref 0–0)
PLATELET # BLD AUTO: 299 X10*3/UL (ref 150–450)
POTASSIUM SERPL-SCNC: 3.9 MMOL/L (ref 3.5–5.3)
PROT SERPL-MCNC: 7.2 G/DL (ref 6.4–8.2)
RBC # BLD AUTO: 5.01 X10*6/UL (ref 4.5–5.9)
SODIUM SERPL-SCNC: 138 MMOL/L (ref 136–145)
TRIGL SERPL-MCNC: 52 MG/DL (ref 0–149)
TSH SERPL-ACNC: 0.65 MIU/L (ref 0.44–3.98)
URATE SERPL-MCNC: 5.3 MG/DL (ref 4–7.5)
VLDL: 10 MG/DL (ref 0–40)
WBC # BLD AUTO: 4 X10*3/UL (ref 4.4–11.3)

## 2024-07-30 DIAGNOSIS — N40.1 BENIGN LOCALIZED HYPERPLASIA OF PROSTATE WITH URINARY OBSTRUCTION: ICD-10-CM

## 2024-07-30 DIAGNOSIS — I10 PRIMARY HYPERTENSION: ICD-10-CM

## 2024-07-30 DIAGNOSIS — N13.8 BENIGN LOCALIZED HYPERPLASIA OF PROSTATE WITH URINARY OBSTRUCTION: ICD-10-CM

## 2024-07-30 RX ORDER — TERAZOSIN 5 MG/1
5 CAPSULE ORAL 2 TIMES DAILY
Qty: 180 CAPSULE | Refills: 1 | Status: SHIPPED | OUTPATIENT
Start: 2024-07-30

## 2024-07-30 RX ORDER — LISINOPRIL 20 MG/1
20 TABLET ORAL DAILY
Qty: 90 TABLET | Refills: 1 | Status: SHIPPED | OUTPATIENT
Start: 2024-07-30

## 2024-08-15 ENCOUNTER — APPOINTMENT (OUTPATIENT)
Dept: UROLOGY | Facility: CLINIC | Age: 59
End: 2024-08-15
Payer: COMMERCIAL

## 2024-09-05 ENCOUNTER — APPOINTMENT (OUTPATIENT)
Dept: UROLOGY | Facility: CLINIC | Age: 59
End: 2024-09-05
Payer: COMMERCIAL

## 2024-09-12 DIAGNOSIS — I10 PRIMARY HYPERTENSION: ICD-10-CM

## 2024-09-12 RX ORDER — TRIAMTERENE AND HYDROCHLOROTHIAZIDE 75; 50 MG/1; MG/1
1 TABLET ORAL DAILY
Qty: 90 TABLET | Refills: 1 | Status: SHIPPED | OUTPATIENT
Start: 2024-09-12

## 2024-10-03 ENCOUNTER — APPOINTMENT (OUTPATIENT)
Dept: UROLOGY | Facility: CLINIC | Age: 59
End: 2024-10-03
Payer: COMMERCIAL

## 2024-10-03 DIAGNOSIS — R97.20 ELEVATED PSA: Primary | ICD-10-CM

## 2024-10-08 ENCOUNTER — APPOINTMENT (OUTPATIENT)
Dept: PRIMARY CARE | Facility: CLINIC | Age: 59
End: 2024-10-08
Payer: COMMERCIAL

## 2024-10-08 ENCOUNTER — CLINICAL SUPPORT (OUTPATIENT)
Dept: PRIMARY CARE | Facility: CLINIC | Age: 59
End: 2024-10-08
Payer: COMMERCIAL

## 2024-10-08 DIAGNOSIS — R97.20 ELEVATED PSA: ICD-10-CM

## 2024-10-08 LAB — PSA SERPL-MCNC: 1.81 NG/ML

## 2024-10-08 PROCEDURE — 36415 COLL VENOUS BLD VENIPUNCTURE: CPT

## 2024-10-08 PROCEDURE — 84153 ASSAY OF PSA TOTAL: CPT

## 2024-10-10 ENCOUNTER — OFFICE VISIT (OUTPATIENT)
Dept: UROLOGY | Facility: CLINIC | Age: 59
End: 2024-10-10
Payer: COMMERCIAL

## 2024-10-10 VITALS
TEMPERATURE: 97.6 F | HEIGHT: 71 IN | HEART RATE: 58 BPM | DIASTOLIC BLOOD PRESSURE: 72 MMHG | OXYGEN SATURATION: 97 % | SYSTOLIC BLOOD PRESSURE: 138 MMHG | BODY MASS INDEX: 37.02 KG/M2 | WEIGHT: 264.4 LBS

## 2024-10-10 DIAGNOSIS — R97.20 ELEVATED PSA: Primary | ICD-10-CM

## 2024-10-10 LAB
POC APPEARANCE, URINE: CLEAR
POC BILIRUBIN, URINE: NEGATIVE
POC BLOOD, URINE: NEGATIVE
POC COLOR, URINE: YELLOW
POC GLUCOSE, URINE: NEGATIVE MG/DL
POC KETONES, URINE: NEGATIVE MG/DL
POC LEUKOCYTES, URINE: NEGATIVE
POC NITRITE,URINE: NEGATIVE
POC PH, URINE: 6 PH
POC PROTEIN, URINE: NEGATIVE MG/DL
POC SPECIFIC GRAVITY, URINE: 1.02
POC UROBILINOGEN, URINE: 0.2 EU/DL

## 2024-10-10 PROCEDURE — 81003 URINALYSIS AUTO W/O SCOPE: CPT | Performed by: PHYSICIAN ASSISTANT

## 2024-10-10 PROCEDURE — 51798 US URINE CAPACITY MEASURE: CPT | Performed by: PHYSICIAN ASSISTANT

## 2024-10-10 PROCEDURE — 3078F DIAST BP <80 MM HG: CPT | Performed by: PHYSICIAN ASSISTANT

## 2024-10-10 PROCEDURE — 3075F SYST BP GE 130 - 139MM HG: CPT | Performed by: PHYSICIAN ASSISTANT

## 2024-10-10 PROCEDURE — 3044F HG A1C LEVEL LT 7.0%: CPT | Performed by: PHYSICIAN ASSISTANT

## 2024-10-10 PROCEDURE — 99214 OFFICE O/P EST MOD 30 MIN: CPT | Performed by: PHYSICIAN ASSISTANT

## 2024-10-10 PROCEDURE — 4010F ACE/ARB THERAPY RXD/TAKEN: CPT | Performed by: PHYSICIAN ASSISTANT

## 2024-10-10 PROCEDURE — 3008F BODY MASS INDEX DOCD: CPT | Performed by: PHYSICIAN ASSISTANT

## 2024-10-10 PROCEDURE — 3048F LDL-C <100 MG/DL: CPT | Performed by: PHYSICIAN ASSISTANT

## 2024-10-10 ASSESSMENT — PAIN SCALES - GENERAL: PAINLEVEL: 0-NO PAIN

## 2024-10-10 NOTE — PROGRESS NOTES
Subjective   Patient ID: Thad Begum is a 59 y.o. male who presents for Follow-up.  HPI  59-year old male with an elevated PSA normalized, BPH, terazosin seen for follow-up.    Patient is doing well symptomatically.  He reports nocturia x 1-2 depending on his fluid intake.  He denies bothersome urinary symptoms during the day.  She denies hematuria or dysuria.    UA negative  PVR minimal  Lab Results   Component Value Date    PSA 1.81 10/08/2024    PSA 2.00 08/03/2023    PSA 12.10 (H) 07/12/2023    PSA 1.21 07/29/2022     Review of Systems   Constitutional: Negative.    HENT: Negative.     Eyes: Negative.    Respiratory: Negative.     Cardiovascular: Negative.    Gastrointestinal: Negative.    Endocrine: Negative.    Genitourinary: Negative.    Musculoskeletal: Negative.    Skin: Negative.    Allergic/Immunologic: Negative.    Neurological: Negative.    Hematological: Negative.    Psychiatric/Behavioral: Negative.           Objective   Physical Exam  Constitutional:       General: He is not in acute distress.     Appearance: Normal appearance.   HENT:      Head: Normocephalic and atraumatic.      Nose: Nose normal.      Mouth/Throat:      Mouth: Mucous membranes are moist.   Cardiovascular:      Rate and Rhythm: Normal rate.   Pulmonary:      Effort: Pulmonary effort is normal.   Abdominal:      General: Abdomen is flat.      Palpations: Abdomen is soft.   Genitourinary:     Penis: Normal.       Testes: Normal.      Prostate: Normal.   Musculoskeletal:      Cervical back: Normal range of motion.   Neurological:      Mental Status: He is alert.         Assessment/Plan     BPH  Will continue terazosin    Since his nocturia is related to fluid intake.  I discussed reducing fluids 2 hours prior to bed.    Elevated PSA  PSA level normalized.  Will continue monitoring PSAs once a year.  Order placed    Follow-up in 1 year or sooner as needed with a prior PSA       Timo Samaniego PA-C 10/11/24 7:06 AM [facility]

## 2024-10-11 ASSESSMENT — ENCOUNTER SYMPTOMS
HEMATOLOGIC/LYMPHATIC NEGATIVE: 1
RESPIRATORY NEGATIVE: 1
ENDOCRINE NEGATIVE: 1
GASTROINTESTINAL NEGATIVE: 1
EYES NEGATIVE: 1
CONSTITUTIONAL NEGATIVE: 1
PSYCHIATRIC NEGATIVE: 1
ALLERGIC/IMMUNOLOGIC NEGATIVE: 1
NEUROLOGICAL NEGATIVE: 1
MUSCULOSKELETAL NEGATIVE: 1
CARDIOVASCULAR NEGATIVE: 1

## 2024-11-08 DIAGNOSIS — E79.0 HYPERURICEMIA WITHOUT SIGNS OF INFLAMMATORY ARTHRITIS AND TOPHACEOUS DISEASE: ICD-10-CM

## 2024-11-08 RX ORDER — FEBUXOSTAT 80 MG/1
80 TABLET, FILM COATED ORAL DAILY
Qty: 90 TABLET | Refills: 1 | Status: SHIPPED | OUTPATIENT
Start: 2024-11-08

## 2024-11-20 ENCOUNTER — APPOINTMENT (OUTPATIENT)
Dept: URGENT CARE | Age: 59
End: 2024-11-20
Payer: COMMERCIAL

## 2024-11-23 DIAGNOSIS — R12 HEARTBURN: ICD-10-CM

## 2024-11-25 RX ORDER — OMEPRAZOLE 40 MG/1
40 CAPSULE, DELAYED RELEASE ORAL DAILY
Qty: 90 CAPSULE | Refills: 1 | Status: SHIPPED | OUTPATIENT
Start: 2024-11-25

## 2025-01-11 DIAGNOSIS — E55.9 VITAMIN D DEFICIENCY: ICD-10-CM

## 2025-01-13 RX ORDER — ACETAMINOPHEN 500 MG
2000 TABLET ORAL DAILY
Qty: 90 CAPSULE | Refills: 1 | Status: SHIPPED | OUTPATIENT
Start: 2025-01-13

## 2025-01-21 DIAGNOSIS — I10 PRIMARY HYPERTENSION: ICD-10-CM

## 2025-01-21 RX ORDER — LISINOPRIL 20 MG/1
20 TABLET ORAL DAILY
Qty: 90 TABLET | Refills: 1 | Status: SHIPPED | OUTPATIENT
Start: 2025-01-21

## 2025-01-24 ENCOUNTER — OFFICE VISIT (OUTPATIENT)
Dept: PRIMARY CARE | Facility: CLINIC | Age: 60
End: 2025-01-24
Payer: COMMERCIAL

## 2025-01-24 VITALS
RESPIRATION RATE: 16 BRPM | DIASTOLIC BLOOD PRESSURE: 71 MMHG | WEIGHT: 273 LBS | BODY MASS INDEX: 38.22 KG/M2 | OXYGEN SATURATION: 99 % | SYSTOLIC BLOOD PRESSURE: 129 MMHG | HEIGHT: 71 IN | TEMPERATURE: 97.4 F | HEART RATE: 75 BPM

## 2025-01-24 DIAGNOSIS — E78.2 COMBINED HYPERLIPIDEMIA: ICD-10-CM

## 2025-01-24 DIAGNOSIS — I10 BENIGN ESSENTIAL HTN: Primary | ICD-10-CM

## 2025-01-24 DIAGNOSIS — E11.9 TYPE 2 DIABETES MELLITUS WITHOUT COMPLICATION, WITHOUT LONG-TERM CURRENT USE OF INSULIN (MULTI): ICD-10-CM

## 2025-01-24 LAB
EST. AVERAGE GLUCOSE BLD GHB EST-MCNC: 151 MG/DL
HBA1C MFR BLD: 6.9 %

## 2025-01-24 PROCEDURE — 36415 COLL VENOUS BLD VENIPUNCTURE: CPT | Performed by: INTERNAL MEDICINE

## 2025-01-24 PROCEDURE — 4010F ACE/ARB THERAPY RXD/TAKEN: CPT | Performed by: INTERNAL MEDICINE

## 2025-01-24 PROCEDURE — 99214 OFFICE O/P EST MOD 30 MIN: CPT | Performed by: INTERNAL MEDICINE

## 2025-01-24 PROCEDURE — 1036F TOBACCO NON-USER: CPT | Performed by: INTERNAL MEDICINE

## 2025-01-24 PROCEDURE — 3008F BODY MASS INDEX DOCD: CPT | Performed by: INTERNAL MEDICINE

## 2025-01-24 PROCEDURE — 3074F SYST BP LT 130 MM HG: CPT | Performed by: INTERNAL MEDICINE

## 2025-01-24 PROCEDURE — 83036 HEMOGLOBIN GLYCOSYLATED A1C: CPT | Performed by: INTERNAL MEDICINE

## 2025-01-24 PROCEDURE — 3078F DIAST BP <80 MM HG: CPT | Performed by: INTERNAL MEDICINE

## 2025-01-24 RX ORDER — ATORVASTATIN CALCIUM 20 MG/1
20 TABLET, FILM COATED ORAL NIGHTLY
Qty: 90 TABLET | Refills: 1 | Status: SHIPPED | OUTPATIENT
Start: 2025-01-24

## 2025-01-24 SDOH — ECONOMIC STABILITY: FOOD INSECURITY: WITHIN THE PAST 12 MONTHS, YOU WORRIED THAT YOUR FOOD WOULD RUN OUT BEFORE YOU GOT MONEY TO BUY MORE.: NEVER TRUE

## 2025-01-24 SDOH — ECONOMIC STABILITY: FOOD INSECURITY: WITHIN THE PAST 12 MONTHS, THE FOOD YOU BOUGHT JUST DIDN'T LAST AND YOU DIDN'T HAVE MONEY TO GET MORE.: NEVER TRUE

## 2025-01-24 ASSESSMENT — PATIENT HEALTH QUESTIONNAIRE - PHQ9
1. LITTLE INTEREST OR PLEASURE IN DOING THINGS: NOT AT ALL
SUM OF ALL RESPONSES TO PHQ9 QUESTIONS 1 AND 2: 0
2. FEELING DOWN, DEPRESSED OR HOPELESS: NOT AT ALL

## 2025-01-24 ASSESSMENT — ENCOUNTER SYMPTOMS
NAUSEA: 0
FEVER: 0
DEPRESSION: 0
LOSS OF SENSATION IN FEET: 0
SHORTNESS OF BREATH: 0
OCCASIONAL FEELINGS OF UNSTEADINESS: 0
ABDOMINAL PAIN: 0
CHILLS: 0
VOMITING: 0

## 2025-01-24 ASSESSMENT — LIFESTYLE VARIABLES: HOW MANY STANDARD DRINKS CONTAINING ALCOHOL DO YOU HAVE ON A TYPICAL DAY: PATIENT DOES NOT DRINK

## 2025-01-24 ASSESSMENT — PAIN SCALES - GENERAL: PAINLEVEL_OUTOF10: 0-NO PAIN

## 2025-01-24 NOTE — PROGRESS NOTES
"Subjective   Patient ID: Thad Begum is a 59 y.o. male who presents for Follow-up.    Presents for follow up. Still grieving the loss of his childhood friend. Getting harder to afford the Jardiance. Not covered now by his insurance.         Review of Systems   Constitutional:  Negative for chills and fever.   Respiratory:  Negative for shortness of breath.    Cardiovascular:  Negative for chest pain.   Gastrointestinal:  Negative for abdominal pain, nausea and vomiting.       Objective   /71   Pulse 75   Temp 36.3 °C (97.4 °F) (Temporal)   Resp 16   Ht 1.803 m (5' 11\")   Wt 124 kg (273 lb)   SpO2 99%   BMI 38.08 kg/m²     Physical Exam  Gen appearance: well groomed in NAD  A & O: alert and oriented x 3  CV: RRR   Lungs: CTA bilaterally  Extr: no edema   Assessment/Plan   HTN: cont med  DM: will refer to Pharm d program for help with med affordability and management  Check A1c today  RTO CPE July       "

## 2025-01-30 ENCOUNTER — TELEMEDICINE (OUTPATIENT)
Dept: PHARMACY | Facility: HOSPITAL | Age: 60
End: 2025-01-30
Payer: COMMERCIAL

## 2025-01-30 DIAGNOSIS — E11.9 TYPE 2 DIABETES MELLITUS WITHOUT COMPLICATION, WITHOUT LONG-TERM CURRENT USE OF INSULIN (MULTI): ICD-10-CM

## 2025-01-30 NOTE — PROGRESS NOTES
Primary Care Clinical Pharmacist Initial Visit      Date of Initial Visit: 1/30/25  Disease state(s) to be managed by clinical pharmacist: DMT2  Referring Provider: Dr. Melany Gann (PCP)  Most recent appointment with PCP: 1/24/25  Next appointment with PCP: 7/28/25            Subjective:    Patient is a 58 YO M who presents virtually to visit with clinical pharmacist for initial visit for management of DMT2.  Patient gives consent to have visit conducted via telehealth. Patient was referred to clinical pharmacist for management of disease state(s) by PCP. At today's visit, patient reports taking Januvia 50mg PO daily and reports compliance with med and denies any ADRs to med but states that he only has 15 doses left of med and will need a new prescription to continue therapy with med which he will be unable to afford. Patient states that prior to taking Januvia he took metformin and that he experienced stomach queasiness with metformin (was not taking metformin with food). Patient states he is open to retrying metformin therapy and states will take med with food to reduce the chance of GI SE.    Patient does not check glucose level at home      Objective:      Most recent hemoglobin A1C level: 6.9 % (1/24/25) (goal: less than 7%)    Most recent eGFR: 83 (7/24/24)  Most recent vitamin B12 level:  none on file for the past 12 months  Most recent Albumin/SCr ratio: none on file for the past 12 months  Most recent AST/ALT: 19/28  (7/24/24)       Most recent diabetic retinopathy exam: 9/2024 (no diabetic retinopathy in either eye)   Most recent influenza vaccine: never-declines vaccine  Most recent pneumococcal vaccine: never      HPL:     Below are the results of patient's most recent lipid panel (from 7/24/24) :    LDL:   77  (goal: less than 70 )  TG :  52  (goal: less than 500)         HDL: 53.3    (goal: greater than 40)    TC: 141 (goal: less than 200)      Assessment:    Patient's DMT2 is currently controlled based on most recent hemoglobin A1C level of 6.9 % from 1/24/25. Patient currently only on Januvia 50mg PO daily but will run out of med soon and will not be able to continue to afford taking med. Patient previously took metformin and experienced GI SE with med, however patient was not taking med with food at the time. Patient is open to retrying metformin therapy and states will take med with food to reduce the chance of GI SE.    Plan:    -Continue Januvia 50mg PO daily   - Instructed patient to go to lab prior to next visit with clinical pharmacist to have CMP, vitamin B12 level, and albumin/SCr ratio rechecked      The patient verbalized understanding of everything discussed at today's visit and agrees with plan formulated by clinical pharmacist    Next visit with clinical pharmacist:  2/13/25 at 2:20 PM via telehealth      Continue all meds under the continuation of care with the referring provider and clinical pharmacy team.        TREVOR Bowen, PharmD, CPh, BCACP  Clinical Pharmacy Specialist, [facility]

## 2025-01-30 NOTE — ASSESSMENT & PLAN NOTE
Assessment:    Patient's DMT2 is currently controlled based on most recent hemoglobin A1C level of 6.9 % from 1/24/25. Patient currently only on Januvia 50mg PO daily but will run out of med soon and will not be able to continue to afford taking med. Patient previously took metformin and experienced GI SE with med, however patient was not taking med with food at the time. Patient is open to retrying metformin therapy and states will take med with food to reduce the chance of GI SE.    Plan:    -Continue Januvia 50mg PO daily   - Instructed patient to go to lab prior to next visit with clinical pharmacist to have CMP, vitamin B12 level, and albumin/SCr ratio rechecked

## 2025-02-02 DIAGNOSIS — N13.8 BENIGN LOCALIZED HYPERPLASIA OF PROSTATE WITH URINARY OBSTRUCTION: ICD-10-CM

## 2025-02-02 DIAGNOSIS — N40.1 BENIGN LOCALIZED HYPERPLASIA OF PROSTATE WITH URINARY OBSTRUCTION: ICD-10-CM

## 2025-02-03 RX ORDER — TERAZOSIN 5 MG/1
5 CAPSULE ORAL 2 TIMES DAILY
Qty: 180 CAPSULE | Refills: 1 | Status: SHIPPED | OUTPATIENT
Start: 2025-02-03 | End: 2025-02-04 | Stop reason: DRUGHIGH

## 2025-02-04 DIAGNOSIS — N13.8 BENIGN LOCALIZED HYPERPLASIA OF PROSTATE WITH URINARY OBSTRUCTION: Primary | ICD-10-CM

## 2025-02-04 DIAGNOSIS — N40.1 BENIGN LOCALIZED HYPERPLASIA OF PROSTATE WITH URINARY OBSTRUCTION: Primary | ICD-10-CM

## 2025-02-04 RX ORDER — TERAZOSIN 10 MG/1
10 CAPSULE ORAL NIGHTLY
Qty: 90 CAPSULE | Refills: 1 | Status: SHIPPED | OUTPATIENT
Start: 2025-02-04 | End: 2025-08-03

## 2025-02-07 ENCOUNTER — CLINICAL SUPPORT (OUTPATIENT)
Dept: PRIMARY CARE | Facility: CLINIC | Age: 60
End: 2025-02-07
Payer: COMMERCIAL

## 2025-02-07 DIAGNOSIS — E11.9 TYPE 2 DIABETES MELLITUS WITHOUT COMPLICATION, WITHOUT LONG-TERM CURRENT USE OF INSULIN (MULTI): ICD-10-CM

## 2025-02-08 LAB
ALBUMIN SERPL-MCNC: 4.4 G/DL (ref 3.6–5.1)
ALBUMIN/CREAT UR: NORMAL
ALP SERPL-CCNC: 73 U/L (ref 35–144)
ALT SERPL-CCNC: 43 U/L (ref 9–46)
ANION GAP SERPL CALCULATED.4IONS-SCNC: 15 MMOL/L (CALC) (ref 7–17)
AST SERPL-CCNC: 28 U/L (ref 10–35)
BILIRUB SERPL-MCNC: 0.7 MG/DL (ref 0.2–1.2)
BUN SERPL-MCNC: 25 MG/DL (ref 7–25)
CALCIUM SERPL-MCNC: 9.3 MG/DL (ref 8.6–10.3)
CHLORIDE SERPL-SCNC: 99 MMOL/L (ref 98–110)
CO2 SERPL-SCNC: 21 MMOL/L (ref 20–32)
CREAT SERPL-MCNC: 1.32 MG/DL (ref 0.7–1.3)
CREAT UR-MCNC: NORMAL MG/DL
EGFRCR SERPLBLD CKD-EPI 2021: 63 ML/MIN/1.73M2
GLUCOSE SERPL-MCNC: 138 MG/DL (ref 65–99)
MICROALBUMIN UR-MCNC: NORMAL
POTASSIUM SERPL-SCNC: 3.5 MMOL/L (ref 3.5–5.3)
PROT SERPL-MCNC: 7.1 G/DL (ref 6.1–8.1)
SODIUM SERPL-SCNC: 135 MMOL/L (ref 135–146)
VIT B12 SERPL-MCNC: 608 PG/ML (ref 200–1100)

## 2025-02-10 LAB
ALBUMIN SERPL-MCNC: 4.4 G/DL (ref 3.6–5.1)
ALBUMIN/CREAT UR: 12 MG/G CREAT
ALP SERPL-CCNC: 73 U/L (ref 35–144)
ALT SERPL-CCNC: 43 U/L (ref 9–46)
ANION GAP SERPL CALCULATED.4IONS-SCNC: 15 MMOL/L (CALC) (ref 7–17)
AST SERPL-CCNC: 28 U/L (ref 10–35)
BILIRUB SERPL-MCNC: 0.7 MG/DL (ref 0.2–1.2)
BUN SERPL-MCNC: 25 MG/DL (ref 7–25)
CALCIUM SERPL-MCNC: 9.3 MG/DL (ref 8.6–10.3)
CHLORIDE SERPL-SCNC: 99 MMOL/L (ref 98–110)
CO2 SERPL-SCNC: 21 MMOL/L (ref 20–32)
CREAT SERPL-MCNC: 1.32 MG/DL (ref 0.7–1.3)
CREAT UR-MCNC: 85 MG/DL (ref 20–320)
EGFRCR SERPLBLD CKD-EPI 2021: 63 ML/MIN/1.73M2
GLUCOSE SERPL-MCNC: 138 MG/DL (ref 65–99)
MICROALBUMIN UR-MCNC: 1 MG/DL
POTASSIUM SERPL-SCNC: 3.5 MMOL/L (ref 3.5–5.3)
PROT SERPL-MCNC: 7.1 G/DL (ref 6.1–8.1)
SODIUM SERPL-SCNC: 135 MMOL/L (ref 135–146)
VIT B12 SERPL-MCNC: 608 PG/ML (ref 200–1100)

## 2025-02-13 ENCOUNTER — APPOINTMENT (OUTPATIENT)
Dept: PHARMACY | Facility: HOSPITAL | Age: 60
End: 2025-02-13
Payer: COMMERCIAL

## 2025-02-13 DIAGNOSIS — E11.9 TYPE 2 DIABETES MELLITUS WITHOUT COMPLICATION, WITHOUT LONG-TERM CURRENT USE OF INSULIN (MULTI): ICD-10-CM

## 2025-02-13 RX ORDER — METFORMIN HYDROCHLORIDE 500 MG/1
1000 TABLET ORAL
Qty: 120 TABLET | Refills: 11 | Status: SHIPPED | OUTPATIENT
Start: 2025-02-13 | End: 2026-03-20

## 2025-02-13 NOTE — PROGRESS NOTES
Primary Care Clinical Pharmacist Follow-Up Visit    Date of Follow-Up Visit: 2/13/25  Date of Initial Visit: 1/30/25  Disease state(s) to be managed by clinical pharmacist: DMT2  Referring Provider: Dr. Melany Gann (PCP)  Most recent appointment with PCP: 1/24/25  Next appointment with PCP: 8/6/25            Subjective:    Patient is a 60 YO M who presents virtually to visit with clinical pharmacist for follow-up visit for management of DMT2.  Patient gives consent to have visit conducted via telehealth. Patient was referred to clinical pharmacist for management of disease state(s) by PCP. At last visit, patient was continued on Januvia 50mg PO daily. At today's visit, patient reports taking Januvia 50mg PO daily and reports compliance with med and denies any ADRs to med. Patient states that he will run out of med after taking tomorrow's dose and will not be able to continue to afford taking med. Patient states that prior to taking Januvia he took metformin and that he experienced stomach queasiness with metformin (was not taking metformin with food). Patient states he is open to retrying metformin therapy and states will take med with food to reduce the chance of GI SE.    Patient does not check glucose level at home      Objective:      Most recent hemoglobin A1C level: 6.9 % (1/24/25) (goal: less than 7%)    Most recent eGFR: 63 (2/7/25)  Most recent vitamin B12 level:  608 (2/7/25)  Most recent Albumin/SCr ratio: 12 (2/7/25)  Most recent AST/ALT: 28/43 (2/7/25)     Most recent diabetic retinopathy exam: 9/2024 (no diabetic retinopathy in either eye)   Most recent influenza vaccine: never-declines vaccine  Most recent pneumococcal vaccine: never      HPL:     Below are the results of patient's most recent lipid panel (from 7/24/24) :    LDL:   77  (goal: less than 70 )  TG :  52  (goal: less than  500)         HDL: 53.3   (goal: greater than 40)    TC: 141 (goal: less than 200)      Assessment:    Patient's DMT2 is currently controlled based on most recent hemoglobin A1C level of 6.9 % from 1/24/25. Patient currently only on Januvia 50mg PO daily but will run out of med after taking tomorrow's dose and will not be able to continue to afford taking med. Patient previously took metformin and experienced GI SE with med, however patient was not taking med with food at the time. Patient is open to retrying metformin therapy and states will take med with food to reduce the chance of GI SE.    Plan:    - Finish supply of Januvia 50mg PO daily and then start taking metformin 500mg tablet (prescription for med electronically sent to patient's preferred pharmacy) according to the following instructions:          - Take 1 tablet (500mg) PO QPM with dinner                    - Increase dose to 1 tablet (500mg) PO BID with meals if able to tolerate 1 tablet (500mg) PO QPM with dinner for 1-2 weeks                   - Increase dose to 2 tablets (1000mg) PO BID with meals if able to tolerate 1 tablet (500mg) PO BID with meals for 1-2 weeks           - Instructed patient to decrease dose back down to highest tolerable dose (and then remain on that dose) if patient not able to tolerate a dose increase in med                    - Instructed patient to stop med and subsequently notify clinical pharmacist if unable to tolerate  1 tablet (500mg) PO QPM with dinner     The patient verbalized understanding of everything discussed at today's visit and agrees with plan formulated by clinical pharmacist    Next visit with clinical pharmacist:  3/13/25 at 2:20 PM via telehealth      Continue all meds under the continuation of care with the referring provider and clinical pharmacy team.        TREVOR Bowen, PharmD, CPh, BCACP  Clinical Pharmacy Specialist, [facility]

## 2025-02-13 NOTE — ASSESSMENT & PLAN NOTE
Assessment:    Patient's DMT2 is currently controlled based on most recent hemoglobin A1C level of 6.9 % from 1/24/25. Patient currently only on Januvia 50mg PO daily but will run out of med after taking tomorrow's dose and will not be able to continue to afford taking med. Patient previously took metformin and experienced GI SE with med, however patient was not taking med with food at the time. Patient is open to retrying metformin therapy and states will take med with food to reduce the chance of GI SE.    Plan:    - Finish supply of Januvia 50mg PO daily and then start taking metformin 500mg tablet (prescription for med electronically sent to patient's preferred pharmacy) according to the following instructions:          - Take 1 tablet (500mg) PO QPM with dinner                    - Increase dose to 1 tablet (500mg) PO BID with meals if able to tolerate 1 tablet (500mg) PO QPM with dinner for 1-2 weeks                   - Increase dose to 2 tablets (1000mg) PO BID with meals if able to tolerate 1 tablet (500mg) PO BID with meals for 1-2 weeks           - Instructed patient to decrease dose back down to highest tolerable dose (and then remain on that dose) if patient not able to tolerate a dose increase in med                    - Instructed patient to stop med and subsequently notify clinical pharmacist if unable to tolerate  1 tablet (500mg) PO QPM with dinner

## 2025-02-13 NOTE — Clinical Note
Of note, patient's eGFR has declined from 83 to 63 in about half a year. Please address with patient as necessary.

## 2025-02-14 ENCOUNTER — APPOINTMENT (OUTPATIENT)
Dept: PRIMARY CARE | Facility: CLINIC | Age: 60
End: 2025-02-14
Payer: COMMERCIAL

## 2025-02-21 DIAGNOSIS — I10 PRIMARY HYPERTENSION: ICD-10-CM

## 2025-02-21 RX ORDER — TRIAMTERENE AND HYDROCHLOROTHIAZIDE 75; 50 MG/1; MG/1
1 TABLET ORAL DAILY
Qty: 90 TABLET | Refills: 1 | Status: SHIPPED | OUTPATIENT
Start: 2025-02-21

## 2025-03-13 ENCOUNTER — APPOINTMENT (OUTPATIENT)
Dept: PHARMACY | Facility: HOSPITAL | Age: 60
End: 2025-03-13
Payer: COMMERCIAL

## 2025-03-13 DIAGNOSIS — E11.9 TYPE 2 DIABETES MELLITUS WITHOUT COMPLICATION, WITHOUT LONG-TERM CURRENT USE OF INSULIN (MULTI): ICD-10-CM

## 2025-03-13 NOTE — PROGRESS NOTES
Primary Care Clinical Pharmacist Follow-Up Visit    Date of Follow-Up Visit: 3/13/25  Date of Initial Visit: 1/30/25  Disease state(s) to be managed by clinical pharmacist: DMT2  Referring Provider: Dr. Melany Gann (PCP)  Most recent appointment with PCP: 1/24/25  Next appointment with PCP: 8/6/25            Subjective:    Patient is a 58 YO M who presents virtually to visit with clinical pharmacist for follow-up visit for management of DMT2.  Patient gives consent to have visit conducted via telehealth. Patient was referred to clinical pharmacist for management of disease state(s) by PCP. At last visit, patient was instructed to finish supply of Januvia 50mg PO daily and then start taking metformin 500mg PO daily (with instructions to double dose every 1-2 weeks as tolerable to ultimately reach maximum effective dose of 1000mg PO BID). At today's visit, patient reports taking metformin 500mg PO daily with evening meal and states that this is the maximum tolerable dose of med for him.      Patient does not check glucose level at home      Objective:      Most recent hemoglobin A1C level: 6.9 % (1/24/25) (goal: less than 7%)    Most recent eGFR: 63 (2/7/25)  Most recent vitamin B12 level:  608 (2/7/25)  Most recent Albumin/SCr ratio: 12 (2/7/25)  Most recent AST/ALT: 28/43 (2/7/25)     Most recent diabetic retinopathy exam: 9/2024 (no diabetic retinopathy in either eye)   Most recent influenza vaccine: never-declines vaccine  Most recent pneumococcal vaccine: never      HPL:     Below are the results of patient's most recent lipid panel (from 7/24/24) :    LDL:   77  (goal: less than 70 )  TG :  52  (goal: less than 500)         HDL: 53.3   (goal: greater than 40)    TC: 141 (goal: less than 200)      Assessment:    Patient's DMT2 is currently controlled based on most recent hemoglobin A1C level of  6.9 % from 1/24/25. Patient currently only on metformin 500mg PO daily with evening meal (maximum tolerable dose) and reports compliance with med. Patient does not check glucose level at home    Plan:  - Continue metformin 500mg PO daily with evening meal          The patient verbalized understanding of everything discussed at today's visit and agrees with plan formulated by clinical pharmacist    Next visit with clinical pharmacist:  5/8/25 at 2:20 PM via telehealth      Continue all meds under the continuation of care with the referring provider and clinical pharmacy team.        TREVOR Bowen, PharmD, CPh, BCACP  Clinical Pharmacy Specialist, [facility]

## 2025-03-14 NOTE — ASSESSMENT & PLAN NOTE
Assessment:    Patient's DMT2 is currently controlled based on most recent hemoglobin A1C level of 6.9 % from 1/24/25. Patient currently only on metformin 500mg PO daily with evening meal (maximum tolerable dose) and reports compliance with med. Patient does not check glucose level at home    Plan:  - Continue metformin 500mg PO daily with evening meal

## 2025-05-08 ENCOUNTER — APPOINTMENT (OUTPATIENT)
Dept: PHARMACY | Facility: HOSPITAL | Age: 60
End: 2025-05-08
Payer: COMMERCIAL

## 2025-05-15 ENCOUNTER — APPOINTMENT (OUTPATIENT)
Dept: PHARMACY | Facility: HOSPITAL | Age: 60
End: 2025-05-15
Payer: COMMERCIAL

## 2025-05-15 DIAGNOSIS — E11.9 TYPE 2 DIABETES MELLITUS WITHOUT COMPLICATION, WITHOUT LONG-TERM CURRENT USE OF INSULIN: ICD-10-CM

## 2025-05-15 NOTE — PROGRESS NOTES
Primary Care Clinical Pharmacist Follow-Up Visit    Date of Follow-Up Visit: 5/15/25  Date of Initial Visit: 1/30/25  Disease state(s) to be managed by clinical pharmacist: DMT2  Referring Provider: Dr. Melany Gann (PCP)  Most recent appointment with PCP: 1/24/25  Next appointment with PCP: 8/6/25            Subjective:    Patient is a 60 YO M who presents virtually to visit with clinical pharmacist for follow-up visit for management of DMT2.  Patient gives consent to have visit conducted via telehealth. Patient was referred to clinical pharmacist for management of disease state(s) by PCP. At last visit, patient was instructed to  continue metformin 500mg PO daily with evening meal (maximum tolerable dose of med). At today's visit, patient reports taking metformin 500mg PO daily with evening meal and reports compliance with med and denies any ADRs to med at this dose.      Patient does not check glucose level at home      Objective:      Most recent hemoglobin A1C level: 6.9 % (1/24/25) (goal: less than 7%)    Most recent eGFR: 59 (3/15/25)  Most recent vitamin B12 level:  608 (2/7/25)  Most recent Albumin/SCr ratio: 12 (2/7/25)  Most recent AST/ALT: 22/32 (3/15/25)  Most recent diabetic retinopathy exam: 9/2024 (no diabetic retinopathy in either eye)   Most recent influenza vaccine: never-declines vaccine  Most recent pneumococcal vaccine: never      HPL:     Below are the results of patient's most recent lipid panel (from 7/24/24) :    LDL:   77  (goal: less than 70 )  TG :  52  (goal: less than 500)         HDL: 53.3   (goal: greater than 40)    TC: 141 (goal: less than 200)      Assessment:    Patient's DMT2 is currently controlled based on most recent hemoglobin A1C level of 6.9 % from 1/24/25. Patient currently only on metformin 500mg PO daily with evening meal (maximum tolerable dose) and  reports compliance with med. Patient does not check glucose level at home    Plan:  - Continue metformin 500mg PO daily with evening meal          The patient verbalized understanding of everything discussed at today's visit and agrees with plan formulated by clinical pharmacist    Next visit with clinical pharmacist:  8/7/25 at 2:20 PM via telehealth      Continue all meds under the continuation of care with the referring provider and clinical pharmacy team.        TREVOR Bowen, PharmD, BCACP  Clinical Pharmacy Specialist, [facility]

## 2025-05-17 DIAGNOSIS — R12 HEARTBURN: ICD-10-CM

## 2025-05-19 RX ORDER — OMEPRAZOLE 40 MG/1
40 CAPSULE, DELAYED RELEASE ORAL DAILY
Qty: 90 CAPSULE | Refills: 1 | Status: SHIPPED | OUTPATIENT
Start: 2025-05-19

## 2025-06-27 DIAGNOSIS — I10 PRIMARY HYPERTENSION: ICD-10-CM

## 2025-06-27 RX ORDER — LISINOPRIL 20 MG/1
20 TABLET ORAL DAILY
Qty: 90 TABLET | Refills: 1 | Status: SHIPPED | OUTPATIENT
Start: 2025-06-27

## 2025-07-17 DIAGNOSIS — E78.2 COMBINED HYPERLIPIDEMIA: ICD-10-CM

## 2025-07-17 RX ORDER — ATORVASTATIN CALCIUM 20 MG/1
20 TABLET, FILM COATED ORAL NIGHTLY
Qty: 90 TABLET | Refills: 1 | Status: SHIPPED | OUTPATIENT
Start: 2025-07-17

## 2025-07-25 DIAGNOSIS — N40.1 BENIGN LOCALIZED HYPERPLASIA OF PROSTATE WITH URINARY OBSTRUCTION: ICD-10-CM

## 2025-07-25 DIAGNOSIS — N13.8 BENIGN LOCALIZED HYPERPLASIA OF PROSTATE WITH URINARY OBSTRUCTION: ICD-10-CM

## 2025-07-25 RX ORDER — TERAZOSIN 10 MG/1
10 CAPSULE ORAL NIGHTLY
Qty: 90 CAPSULE | Refills: 1 | Status: SHIPPED | OUTPATIENT
Start: 2025-07-25 | End: 2026-01-21

## 2025-07-28 ENCOUNTER — APPOINTMENT (OUTPATIENT)
Dept: PRIMARY CARE | Facility: CLINIC | Age: 60
End: 2025-07-28
Payer: COMMERCIAL

## 2025-07-30 ENCOUNTER — APPOINTMENT (OUTPATIENT)
Dept: PRIMARY CARE | Facility: CLINIC | Age: 60
End: 2025-07-30
Payer: COMMERCIAL

## 2025-08-06 ENCOUNTER — APPOINTMENT (OUTPATIENT)
Dept: PRIMARY CARE | Facility: CLINIC | Age: 60
End: 2025-08-06
Payer: COMMERCIAL

## 2025-08-07 ENCOUNTER — OFFICE VISIT (OUTPATIENT)
Dept: PRIMARY CARE | Facility: CLINIC | Age: 60
End: 2025-08-07
Payer: COMMERCIAL

## 2025-08-07 ENCOUNTER — APPOINTMENT (OUTPATIENT)
Dept: PHARMACY | Facility: HOSPITAL | Age: 60
End: 2025-08-07
Payer: COMMERCIAL

## 2025-08-07 VITALS
RESPIRATION RATE: 16 BRPM | DIASTOLIC BLOOD PRESSURE: 74 MMHG | SYSTOLIC BLOOD PRESSURE: 128 MMHG | OXYGEN SATURATION: 96 % | HEIGHT: 71 IN | WEIGHT: 252.2 LBS | HEART RATE: 77 BPM | BODY MASS INDEX: 35.31 KG/M2 | TEMPERATURE: 97.8 F

## 2025-08-07 DIAGNOSIS — Z00.00 ENCOUNTER FOR PREVENTIVE HEALTH EXAMINATION: ICD-10-CM

## 2025-08-07 DIAGNOSIS — R00.2 PALPITATIONS: Primary | ICD-10-CM

## 2025-08-07 PROCEDURE — 4010F ACE/ARB THERAPY RXD/TAKEN: CPT | Performed by: INTERNAL MEDICINE

## 2025-08-07 PROCEDURE — 3074F SYST BP LT 130 MM HG: CPT | Performed by: INTERNAL MEDICINE

## 2025-08-07 PROCEDURE — 1036F TOBACCO NON-USER: CPT | Performed by: INTERNAL MEDICINE

## 2025-08-07 PROCEDURE — 3078F DIAST BP <80 MM HG: CPT | Performed by: INTERNAL MEDICINE

## 2025-08-07 PROCEDURE — 99213 OFFICE O/P EST LOW 20 MIN: CPT | Performed by: INTERNAL MEDICINE

## 2025-08-07 PROCEDURE — 99396 PREV VISIT EST AGE 40-64: CPT | Performed by: INTERNAL MEDICINE

## 2025-08-07 PROCEDURE — 3008F BODY MASS INDEX DOCD: CPT | Performed by: INTERNAL MEDICINE

## 2025-08-07 PROCEDURE — 36415 COLL VENOUS BLD VENIPUNCTURE: CPT | Performed by: INTERNAL MEDICINE

## 2025-08-07 SDOH — ECONOMIC STABILITY: FOOD INSECURITY: WITHIN THE PAST 12 MONTHS, THE FOOD YOU BOUGHT JUST DIDN'T LAST AND YOU DIDN'T HAVE MONEY TO GET MORE.: NEVER TRUE

## 2025-08-07 SDOH — ECONOMIC STABILITY: FOOD INSECURITY: WITHIN THE PAST 12 MONTHS, YOU WORRIED THAT YOUR FOOD WOULD RUN OUT BEFORE YOU GOT MONEY TO BUY MORE.: NEVER TRUE

## 2025-08-07 ASSESSMENT — ENCOUNTER SYMPTOMS
SHORTNESS OF BREATH: 0
VOMITING: 0
OCCASIONAL FEELINGS OF UNSTEADINESS: 0
DEPRESSION: 0
NAUSEA: 0
LOSS OF SENSATION IN FEET: 0
CHILLS: 0
FEVER: 0
ABDOMINAL PAIN: 0

## 2025-08-07 ASSESSMENT — PATIENT HEALTH QUESTIONNAIRE - PHQ9
SUM OF ALL RESPONSES TO PHQ9 QUESTIONS 1 AND 2: 0
1. LITTLE INTEREST OR PLEASURE IN DOING THINGS: NOT AT ALL
2. FEELING DOWN, DEPRESSED OR HOPELESS: NOT AT ALL

## 2025-08-07 ASSESSMENT — LIFESTYLE VARIABLES: HOW MANY STANDARD DRINKS CONTAINING ALCOHOL DO YOU HAVE ON A TYPICAL DAY: PATIENT DOES NOT DRINK

## 2025-08-07 ASSESSMENT — PAIN SCALES - GENERAL: PAINLEVEL_OUTOF10: 0-NO PAIN

## 2025-08-07 NOTE — PROGRESS NOTES
"Subjective   Patient ID: Thad Begum is a 59 y.o. male who presents for Annual Exam (CPE).    Presents for annual exam. Seen in [facility] for heart palps. Wore a Zio patch which did record some episodes of tachycardia.          Review of Systems   Constitutional:  Negative for chills and fever.   Respiratory:  Negative for shortness of breath.    Cardiovascular:  Negative for chest pain.   Gastrointestinal:  Negative for abdominal pain, nausea and vomiting.       Objective   /74   Pulse 77   Temp 36.6 °C (97.8 °F) (Temporal)   Resp 16   Ht 1.803 m (5' 11\")   Wt 114 kg (252 lb 3.2 oz)   SpO2 96%   BMI 35.17 kg/m²     Physical Exam  Gen:well groomed in NAD  A & O: alert and oriented x 3  HEENT: PEERL, EOMI, TM's clear bilaterally, no carotid bruits, no palpable thyroid nodules  CV: RRR  Lungs: CTA bilaterally  Abd: soft, NT/ND  Extr: no edema  Neuro: CN 2-12 intact, motor 5/5, sensation intact  Rectal: deferred     Assessment/Plan  Check labs  Palpitations/tachycardia: refer to Cards  Follow up with   RTO 6 mos       "

## 2025-08-08 LAB
25(OH)D3+25(OH)D2 SERPL-MCNC: 40 NG/ML (ref 30–100)
ALBUMIN SERPL-MCNC: 4.3 G/DL (ref 3.6–5.1)
ALP SERPL-CCNC: 68 U/L (ref 35–144)
ALT SERPL-CCNC: 36 U/L (ref 9–46)
ANION GAP SERPL CALCULATED.4IONS-SCNC: 11 MMOL/L (CALC) (ref 7–17)
AST SERPL-CCNC: 26 U/L (ref 10–35)
BILIRUB SERPL-MCNC: 0.5 MG/DL (ref 0.2–1.2)
BUN SERPL-MCNC: 24 MG/DL (ref 7–25)
CALCIUM SERPL-MCNC: 9.8 MG/DL (ref 8.6–10.3)
CHLORIDE SERPL-SCNC: 106 MMOL/L (ref 98–110)
CHOLEST SERPL-MCNC: 148 MG/DL
CHOLEST/HDLC SERPL: 2.4 (CALC)
CO2 SERPL-SCNC: 25 MMOL/L (ref 20–32)
CREAT SERPL-MCNC: 1.29 MG/DL (ref 0.7–1.3)
EGFRCR SERPLBLD CKD-EPI 2021: 64 ML/MIN/1.73M2
EST. AVERAGE GLUCOSE BLD GHB EST-MCNC: 137 MG/DL
EST. AVERAGE GLUCOSE BLD GHB EST-SCNC: 7.6 MMOL/L
GLUCOSE SERPL-MCNC: 136 MG/DL (ref 65–99)
HBA1C MFR BLD: 6.4 %
HDLC SERPL-MCNC: 61 MG/DL
LDLC SERPL CALC-MCNC: 73 MG/DL (CALC)
NONHDLC SERPL-MCNC: 87 MG/DL (CALC)
POTASSIUM SERPL-SCNC: 3.6 MMOL/L (ref 3.5–5.3)
PROT SERPL-MCNC: 7 G/DL (ref 6.1–8.1)
SODIUM SERPL-SCNC: 142 MMOL/L (ref 135–146)
TRIGL SERPL-MCNC: 68 MG/DL
TSH SERPL-ACNC: 0.61 MIU/L (ref 0.4–4.5)

## 2025-08-12 ENCOUNTER — APPOINTMENT (OUTPATIENT)
Dept: PHARMACY | Facility: HOSPITAL | Age: 60
End: 2025-08-12
Payer: COMMERCIAL

## 2025-08-12 DIAGNOSIS — E11.9 TYPE 2 DIABETES MELLITUS WITHOUT COMPLICATION, WITHOUT LONG-TERM CURRENT USE OF INSULIN: Primary | ICD-10-CM

## 2025-08-17 DIAGNOSIS — I10 PRIMARY HYPERTENSION: ICD-10-CM

## 2025-08-18 RX ORDER — TRIAMTERENE AND HYDROCHLOROTHIAZIDE 75; 50 MG/1; MG/1
1 TABLET ORAL
Qty: 90 TABLET | Refills: 1 | Status: SHIPPED | OUTPATIENT
Start: 2025-08-18

## 2025-09-05 ENCOUNTER — OFFICE VISIT (OUTPATIENT)
Dept: CARDIOLOGY | Facility: CLINIC | Age: 60
End: 2025-09-05
Payer: COMMERCIAL

## 2025-09-05 VITALS
HEART RATE: 100 BPM | WEIGHT: 256 LBS | OXYGEN SATURATION: 95 % | SYSTOLIC BLOOD PRESSURE: 158 MMHG | BODY MASS INDEX: 35.7 KG/M2 | DIASTOLIC BLOOD PRESSURE: 80 MMHG

## 2025-09-05 DIAGNOSIS — E11.9 NON-INSULIN DEPENDENT TYPE 2 DIABETES MELLITUS (MULTI): ICD-10-CM

## 2025-09-05 DIAGNOSIS — I10 BENIGN ESSENTIAL HTN: ICD-10-CM

## 2025-09-05 DIAGNOSIS — R00.2 PALPITATIONS: Primary | ICD-10-CM

## 2025-09-05 PROCEDURE — 3044F HG A1C LEVEL LT 7.0%: CPT | Performed by: INTERNAL MEDICINE

## 2025-09-05 PROCEDURE — 99204 OFFICE O/P NEW MOD 45 MIN: CPT | Performed by: INTERNAL MEDICINE

## 2025-09-05 PROCEDURE — 99202 OFFICE O/P NEW SF 15 MIN: CPT

## 2025-09-05 PROCEDURE — 4010F ACE/ARB THERAPY RXD/TAKEN: CPT | Performed by: INTERNAL MEDICINE

## 2025-09-05 PROCEDURE — 3079F DIAST BP 80-89 MM HG: CPT | Performed by: INTERNAL MEDICINE

## 2025-09-05 PROCEDURE — 3077F SYST BP >= 140 MM HG: CPT | Performed by: INTERNAL MEDICINE

## 2025-09-05 RX ORDER — METOPROLOL SUCCINATE 25 MG/1
25 TABLET, EXTENDED RELEASE ORAL DAILY
Qty: 30 TABLET | Refills: 3 | Status: SHIPPED | OUTPATIENT
Start: 2025-09-05 | End: 2026-09-05

## 2025-10-09 ENCOUNTER — APPOINTMENT (OUTPATIENT)
Dept: UROLOGY | Facility: CLINIC | Age: 60
End: 2025-10-09
Payer: COMMERCIAL

## 2025-11-04 ENCOUNTER — APPOINTMENT (OUTPATIENT)
Dept: PHARMACY | Facility: HOSPITAL | Age: 60
End: 2025-11-04
Payer: COMMERCIAL